# Patient Record
Sex: FEMALE | Race: BLACK OR AFRICAN AMERICAN
[De-identification: names, ages, dates, MRNs, and addresses within clinical notes are randomized per-mention and may not be internally consistent; named-entity substitution may affect disease eponyms.]

---

## 2017-03-13 PROBLEM — Z00.00 ENCOUNTER FOR PREVENTIVE HEALTH EXAMINATION: Status: ACTIVE | Noted: 2017-03-13

## 2017-04-11 ENCOUNTER — NON-APPOINTMENT (OUTPATIENT)
Age: 82
End: 2017-04-11

## 2017-04-11 ENCOUNTER — APPOINTMENT (OUTPATIENT)
Dept: GERIATRICS | Facility: CLINIC | Age: 82
End: 2017-04-11

## 2017-04-11 VITALS
HEIGHT: 67 IN | SYSTOLIC BLOOD PRESSURE: 130 MMHG | DIASTOLIC BLOOD PRESSURE: 72 MMHG | HEART RATE: 95 BPM | TEMPERATURE: 95.9 F | BODY MASS INDEX: 16.79 KG/M2 | WEIGHT: 107 LBS | OXYGEN SATURATION: 95 %

## 2017-04-11 DIAGNOSIS — Z87.891 PERSONAL HISTORY OF NICOTINE DEPENDENCE: ICD-10-CM
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
## 2017-04-11 DIAGNOSIS — J32.0 CHRONIC MAXILLARY SINUSITIS: ICD-10-CM

## 2017-04-11 DIAGNOSIS — R68.89 OTHER GENERAL SYMPTOMS AND SIGNS: ICD-10-CM

## 2017-04-11 DIAGNOSIS — Z71.89 OTHER SPECIFIED COUNSELING: ICD-10-CM

## 2017-04-11 DIAGNOSIS — C50.911 MALIGNANT NEOPLASM OF UNSPECIFIED SITE OF RIGHT FEMALE BREAST: ICD-10-CM

## 2017-04-11 DIAGNOSIS — M81.0 AGE-RELATED OSTEOPOROSIS W/OUT CURRENT PATHOLOGICAL FRACTURE: ICD-10-CM
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
## 2017-04-11 DIAGNOSIS — E46 UNSPECIFIED PROTEIN-CALORIE MALNUTRITION: ICD-10-CM

## 2017-04-11 DIAGNOSIS — Z29.9 ENCOUNTER FOR PROPHYLACTIC MEASURES, UNSPECIFIED: ICD-10-CM

## 2017-04-11 DIAGNOSIS — R63.4 ABNORMAL WEIGHT LOSS: ICD-10-CM

## 2017-04-11 RX ORDER — LETROZOLE 2.5 MG/1
2.5 TABLET, FILM COATED ORAL DAILY
Refills: 0 | Status: ACTIVE | COMMUNITY
Start: 2017-04-11

## 2017-04-11 RX ORDER — CHROMIUM 200 MCG
1000 TABLET ORAL DAILY
Qty: 30 | Refills: 3 | Status: ACTIVE | COMMUNITY
Start: 2017-04-11 | End: 1900-01-01

## 2017-04-11 RX ORDER — DOCUSATE SODIUM 100 MG/1
100 CAPSULE ORAL
Refills: 0 | Status: ACTIVE | COMMUNITY
Start: 2017-04-11

## 2017-04-14 ENCOUNTER — MESSAGE (OUTPATIENT)
Age: 82
End: 2017-04-14

## 2017-05-30 ENCOUNTER — APPOINTMENT (OUTPATIENT)
Dept: GERIATRICS | Facility: CLINIC | Age: 82
End: 2017-05-30

## 2017-07-18 ENCOUNTER — APPOINTMENT (OUTPATIENT)
Dept: GERIATRICS | Facility: CLINIC | Age: 82
End: 2017-07-18

## 2021-12-12 ENCOUNTER — EMERGENCY (EMERGENCY)
Facility: HOSPITAL | Age: 86
LOS: 1 days | Discharge: HOME | End: 2021-12-12
Attending: EMERGENCY MEDICINE
Payer: MEDICARE

## 2021-12-12 VITALS
DIASTOLIC BLOOD PRESSURE: 65 MMHG | OXYGEN SATURATION: 96 % | SYSTOLIC BLOOD PRESSURE: 145 MMHG | RESPIRATION RATE: 18 BRPM | HEART RATE: 85 BPM | TEMPERATURE: 98 F

## 2021-12-12 VITALS — HEIGHT: 66 IN | WEIGHT: 110.01 LBS

## 2021-12-12 LAB
ALBUMIN SERPL ELPH-MCNC: 3.6 G/DL — SIGNIFICANT CHANGE UP (ref 3.3–5)
ALP SERPL-CCNC: 123 U/L — HIGH (ref 40–120)
ALT FLD-CCNC: 20 U/L — SIGNIFICANT CHANGE UP (ref 10–45)
ANION GAP SERPL CALC-SCNC: 14 MMOL/L — SIGNIFICANT CHANGE UP (ref 5–17)
APPEARANCE UR: CLEAR — SIGNIFICANT CHANGE UP
APTT BLD: 26.8 SEC — LOW (ref 27.5–35.5)
AST SERPL-CCNC: 26 U/L — SIGNIFICANT CHANGE UP (ref 10–40)
BACTERIA # UR AUTO: NEGATIVE — SIGNIFICANT CHANGE UP
BASOPHILS # BLD AUTO: 0.04 K/UL — SIGNIFICANT CHANGE UP (ref 0–0.2)
BASOPHILS NFR BLD AUTO: 0.3 % — SIGNIFICANT CHANGE UP (ref 0–2)
BILIRUB SERPL-MCNC: 0.5 MG/DL — SIGNIFICANT CHANGE UP (ref 0.2–1.2)
BILIRUB UR-MCNC: NEGATIVE — SIGNIFICANT CHANGE UP
BUN SERPL-MCNC: 16 MG/DL — SIGNIFICANT CHANGE UP (ref 7–23)
CALCIUM SERPL-MCNC: 9.6 MG/DL — SIGNIFICANT CHANGE UP (ref 8.4–10.5)
CHLORIDE SERPL-SCNC: 97 MMOL/L — SIGNIFICANT CHANGE UP (ref 96–108)
CO2 SERPL-SCNC: 22 MMOL/L — SIGNIFICANT CHANGE UP (ref 22–31)
COLOR SPEC: COLORLESS — SIGNIFICANT CHANGE UP
CREAT SERPL-MCNC: 0.77 MG/DL — SIGNIFICANT CHANGE UP (ref 0.5–1.3)
DIFF PNL FLD: NEGATIVE — SIGNIFICANT CHANGE UP
EOSINOPHIL # BLD AUTO: 0.01 K/UL — SIGNIFICANT CHANGE UP (ref 0–0.5)
EOSINOPHIL NFR BLD AUTO: 0.1 % — SIGNIFICANT CHANGE UP (ref 0–6)
EPI CELLS # UR: 2 /HPF — SIGNIFICANT CHANGE UP
GLUCOSE SERPL-MCNC: 116 MG/DL — HIGH (ref 70–99)
GLUCOSE UR QL: NEGATIVE — SIGNIFICANT CHANGE UP
HCT VFR BLD CALC: 44.4 % — SIGNIFICANT CHANGE UP (ref 34.5–45)
HGB BLD-MCNC: 14.7 G/DL — SIGNIFICANT CHANGE UP (ref 11.5–15.5)
HYALINE CASTS # UR AUTO: 1 /LPF — SIGNIFICANT CHANGE UP (ref 0–2)
IMM GRANULOCYTES NFR BLD AUTO: 0.5 % — SIGNIFICANT CHANGE UP (ref 0–1.5)
INR BLD: 1.03 RATIO — SIGNIFICANT CHANGE UP (ref 0.88–1.16)
KETONES UR-MCNC: NEGATIVE — SIGNIFICANT CHANGE UP
LACTATE SERPL-SCNC: 2.3 MMOL/L — HIGH (ref 0.7–2)
LEUKOCYTE ESTERASE UR-ACNC: ABNORMAL
LYMPHOCYTES # BLD AUTO: 0.92 K/UL — LOW (ref 1–3.3)
LYMPHOCYTES # BLD AUTO: 7.8 % — LOW (ref 13–44)
MCHC RBC-ENTMCNC: 28.3 PG — SIGNIFICANT CHANGE UP (ref 27–34)
MCHC RBC-ENTMCNC: 33.1 GM/DL — SIGNIFICANT CHANGE UP (ref 32–36)
MCV RBC AUTO: 85.5 FL — SIGNIFICANT CHANGE UP (ref 80–100)
MONOCYTES # BLD AUTO: 0.41 K/UL — SIGNIFICANT CHANGE UP (ref 0–0.9)
MONOCYTES NFR BLD AUTO: 3.5 % — SIGNIFICANT CHANGE UP (ref 2–14)
NEUTROPHILS # BLD AUTO: 10.43 K/UL — HIGH (ref 1.8–7.4)
NEUTROPHILS NFR BLD AUTO: 87.8 % — HIGH (ref 43–77)
NITRITE UR-MCNC: NEGATIVE — SIGNIFICANT CHANGE UP
NRBC # BLD: 0 /100 WBCS — SIGNIFICANT CHANGE UP (ref 0–0)
PH UR: 7 — SIGNIFICANT CHANGE UP (ref 5–8)
PLATELET # BLD AUTO: 126 K/UL — LOW (ref 150–400)
POTASSIUM SERPL-MCNC: 4.4 MMOL/L — SIGNIFICANT CHANGE UP (ref 3.5–5.3)
POTASSIUM SERPL-SCNC: 4.4 MMOL/L — SIGNIFICANT CHANGE UP (ref 3.5–5.3)
PROT SERPL-MCNC: 7.7 G/DL — SIGNIFICANT CHANGE UP (ref 6–8.3)
PROT UR-MCNC: ABNORMAL
PROTHROM AB SERPL-ACNC: 12.3 SEC — SIGNIFICANT CHANGE UP (ref 10.6–13.6)
RBC # BLD: 5.19 M/UL — SIGNIFICANT CHANGE UP (ref 3.8–5.2)
RBC # FLD: 14.4 % — SIGNIFICANT CHANGE UP (ref 10.3–14.5)
RBC CASTS # UR COMP ASSIST: 2 /HPF — SIGNIFICANT CHANGE UP (ref 0–4)
SARS-COV-2 RNA SPEC QL NAA+PROBE: SIGNIFICANT CHANGE UP
SODIUM SERPL-SCNC: 133 MMOL/L — LOW (ref 135–145)
SP GR SPEC: 1.03 — HIGH (ref 1.01–1.02)
UROBILINOGEN FLD QL: NEGATIVE — SIGNIFICANT CHANGE UP
WBC # BLD: 11.87 K/UL — HIGH (ref 3.8–10.5)
WBC # FLD AUTO: 11.87 K/UL — HIGH (ref 3.8–10.5)
WBC UR QL: 18 /HPF — HIGH (ref 0–5)

## 2021-12-12 PROCEDURE — 93010 ELECTROCARDIOGRAM REPORT: CPT

## 2021-12-12 PROCEDURE — 93005 ELECTROCARDIOGRAM TRACING: CPT

## 2021-12-12 PROCEDURE — 76377 3D RENDER W/INTRP POSTPROCES: CPT | Mod: 26

## 2021-12-12 PROCEDURE — 99284 EMERGENCY DEPT VISIT MOD MDM: CPT

## 2021-12-12 PROCEDURE — 72125 CT NECK SPINE W/O DYE: CPT | Mod: 26,MA

## 2021-12-12 PROCEDURE — 81001 URINALYSIS AUTO W/SCOPE: CPT

## 2021-12-12 PROCEDURE — 72192 CT PELVIS W/O DYE: CPT | Mod: MA

## 2021-12-12 PROCEDURE — 99284 EMERGENCY DEPT VISIT MOD MDM: CPT | Mod: 25

## 2021-12-12 PROCEDURE — 80053 COMPREHEN METABOLIC PANEL: CPT

## 2021-12-12 PROCEDURE — 71045 X-RAY EXAM CHEST 1 VIEW: CPT | Mod: 26

## 2021-12-12 PROCEDURE — 70450 CT HEAD/BRAIN W/O DYE: CPT | Mod: MA

## 2021-12-12 PROCEDURE — 85730 THROMBOPLASTIN TIME PARTIAL: CPT

## 2021-12-12 PROCEDURE — 73501 X-RAY EXAM HIP UNI 1 VIEW: CPT

## 2021-12-12 PROCEDURE — 85025 COMPLETE CBC W/AUTO DIFF WBC: CPT

## 2021-12-12 PROCEDURE — U0005: CPT

## 2021-12-12 PROCEDURE — 73552 X-RAY EXAM OF FEMUR 2/>: CPT | Mod: 26,50

## 2021-12-12 PROCEDURE — 72190 X-RAY EXAM OF PELVIS: CPT

## 2021-12-12 PROCEDURE — 72192 CT PELVIS W/O DYE: CPT | Mod: 26,MA

## 2021-12-12 PROCEDURE — 72125 CT NECK SPINE W/O DYE: CPT | Mod: MA

## 2021-12-12 PROCEDURE — 83605 ASSAY OF LACTIC ACID: CPT

## 2021-12-12 PROCEDURE — 70450 CT HEAD/BRAIN W/O DYE: CPT | Mod: 26,MA

## 2021-12-12 PROCEDURE — 70487 CT MAXILLOFACIAL W/DYE: CPT | Mod: 26,MA

## 2021-12-12 PROCEDURE — 73501 X-RAY EXAM HIP UNI 1 VIEW: CPT | Mod: 26,RT

## 2021-12-12 PROCEDURE — U0003: CPT

## 2021-12-12 PROCEDURE — 76377 3D RENDER W/INTRP POSTPROCES: CPT

## 2021-12-12 PROCEDURE — 76377 3D RENDER W/INTRP POSTPROCES: CPT | Mod: 26,77

## 2021-12-12 PROCEDURE — 70487 CT MAXILLOFACIAL W/DYE: CPT | Mod: MA

## 2021-12-12 PROCEDURE — 71045 X-RAY EXAM CHEST 1 VIEW: CPT

## 2021-12-12 PROCEDURE — 72170 X-RAY EXAM OF PELVIS: CPT | Mod: 26,59

## 2021-12-12 PROCEDURE — 73552 X-RAY EXAM OF FEMUR 2/>: CPT

## 2021-12-12 PROCEDURE — 85610 PROTHROMBIN TIME: CPT

## 2021-12-12 RX ORDER — SODIUM CHLORIDE 9 MG/ML
500 INJECTION INTRAMUSCULAR; INTRAVENOUS; SUBCUTANEOUS ONCE
Refills: 0 | Status: COMPLETED | OUTPATIENT
Start: 2021-12-12 | End: 2021-12-12

## 2021-12-12 RX ORDER — ACETAMINOPHEN 500 MG
650 TABLET ORAL ONCE
Refills: 0 | Status: COMPLETED | OUTPATIENT
Start: 2021-12-12 | End: 2021-12-12

## 2021-12-12 RX ADMIN — SODIUM CHLORIDE 1000 MILLILITER(S): 9 INJECTION INTRAMUSCULAR; INTRAVENOUS; SUBCUTANEOUS at 08:48

## 2021-12-12 RX ADMIN — Medication 150 MILLIGRAM(S): at 13:23

## 2021-12-12 NOTE — ED PROVIDER NOTE - OBJECTIVE STATEMENT
89yo F h.o breast cancer s/p mastectomy (recent PET scan with no residual disease) presenting to ED after fall at home with R hip pain and limited ambulation. Pt reports was getting into bed when she misjudged where the edge of the bed was, missed and fell onto the carpeted ground (no LOC, unk if head trauma, no blood thinners). On the ground for only 20 seconds, was immediately brought up to bed by daughter. Complained of L hip pain without radiation, mild, sore in quality. No abdominal pain, headache, visual changes, sob, cough, fever/chills. Recent 40 lb weight loss, malignancy work up as outpatient. On tramadol for pain, recent dental procedure yesterday for poor dentition. ROS otherwise negative.

## 2021-12-12 NOTE — ED PROVIDER NOTE - PROGRESS NOTE DETAILS
Semaj Reynolds, PGY-2: Pt reexamined at bedside, resting comfortably, vitals stable. CXR w minimal atelectasis, Xray hip/pelvis/femurs negative for fracture. No metabolic derrangement. Leukocytosis w L shift, urine pending. Semaj Reynolds, PGY-2: CT demonstrates acute inferior and superior pubic rami fracture. Orthopedics paged. Dental paged given poor dentition and recent abscess, will see patient. Semaj Reynolds, PGY-2: Pt reexamined at bedside, resting comfortably, vitals stable. Seen by orthopedics who recommend weight bearing as tolerated, dental recommends outpatient dental surgeon follow up. Pt agreeable to discharge at assisted living.

## 2021-12-12 NOTE — ED ADULT NURSE REASSESSMENT NOTE - NS ED NURSE REASSESS COMMENT FT1
Pt placed on primafit. Repositioned for comfort. Denies tylenol. Daughter at bedside
Patient resting comfortably, states she has no pain until she moves, breathing unlabored, placed on Primafit, family at bedside, denies chest pain, SOB, n/v, VS stable, will continue to monitor VS and reassess pain.

## 2021-12-12 NOTE — ED PROVIDER NOTE - ATTENDING CONTRIBUTION TO CARE
Patient is a 89 yo F here for evaluation after fall at home. Patient is able to provide history, states that she fell from bed, landed on her bottom. She is unsure if she hit her head or passed out. Daughter is at bedside, states that patient is now requiring a lot of assistance with walking and usually does not require a cane or walker. Of note, patient has had significant weight loss, is now being evaluated by a private dentist for dental caries and concern for malignancy in her mouth. She had a tooth pulled yesterday, abscess drained was started on clindamycin and tramadol. Daughter gave patient tramadol after the fall. Patient denies any headache, back pain, chest pain, abdominal pain, but does complain of pain to right leg and right hip. No recent illness. No fevers, chills, nausea, vomiting, diarrhea. Patient has had recent urinary frequency per daughter.     VS noted  Gen. no acute distress, Non toxic   HEENT: EOMI, mmm, poor dentition, upper right molar appears to be infected  Spine: No C-T-L spine tenderness  Lungs: CTAB/L no C/ W /R   CVS: RRR   Abd; Soft non tender, non distended   Pelvis: stable, full ROM of bilateral hips, no obvious deformity. + 2 DP pulses bilaterally  Ext: no edema  Skin: no rash  Neuro AAOx3 non focal clear speech  a/p: s/p fall - unclear loc, head is atraumatic. Will get CT Head, CT C-spine, CXR, pelvis XR, labs, CT Max/Face with IV contrast to evaluate for dental disease, u/a. Will give tylenol and reassess. If patient cannot ambulate, may need CT Pelvis and admission.   - Ruby VALDES

## 2021-12-12 NOTE — ED PROVIDER NOTE - PHYSICAL EXAMINATION
GENERAL: non-toxic appearing, cachectic, alert, in NAD  HEENT: Poor dentition, atraumatic, normocephalic, Vision grossly intact, no conjunctivitis or discharge, hearing grossly intact,  no nasal discharge, epistaxis   CARDIAC: RRR, normal S1S2,  no appreciable murmurs, no cyanosis, cap refill < 2 seconds  PULM: normal work of breathing, oxygen saturation on RA wnl, CTAB, no crackles, rales, rhonchi, or wheezing  GI: abdomen nondistended, soft, nontender, no guarding or rebound tenderness, no palpable masses  NEURO: awake and alert, follows commands, normal speech, PERRLA, EOMI, no focal motor or sensory deficits  MSK: mild tenderness of b/l hips with no gross deformity with full ROM at b/l hips and knees, no c-spine tenderness, no joint swelling or erythema, ranging all extremities with no appreciable loss of ROM  EXT: no peripheral edema, calf tenderness, redness or swelling  SKIN: warm, dry, and intact, no rashes  PSYCH: appropriate mood and affect

## 2021-12-12 NOTE — ED PROVIDER NOTE - NSFOLLOWUPCLINICS_GEN_ALL_ED_FT
Oral & Maxillofacial Surgery  Department of Dental Medicine  [phone] [place] [address]  Phone: [phone]  Fax: [phone]

## 2021-12-12 NOTE — ED PROVIDER NOTE - CARE PROVIDER_API CALL
Caleb Jones)  Orthopedics  [address] [place] [address]  Phone: [phone]  Fax: [phone]  Follow Up Time:

## 2021-12-12 NOTE — CONSULT NOTE ADULT - SUBJECTIVE AND OBJECTIVE BOX
Orthopedic Surgery Consult Note    89y Female presents c/o R groin pain and difficulty with ambulation s/p mechanical fall. Able to ambulate after the fall. Denies HS/LOC. Denies numbness/tingling. Denies fever/chills. Denies pain/injury elsewhere. Patient is an independent community ambulator at baseline.     PAST MEDICAL & SURGICAL HISTORY:  Breast cancer      MEDICATIONS  (STANDING):    Allergies    penicillin (Hives)    Intolerances                              14.7   11.87 )-----------( 126      ( 12 Dec 2021 08:53 )             44.4     12 Dec 2021 08:53    133    |  97     |  16     ----------------------------<  116    4.4     |  22     |  0.77     Ca    9.6        12 Dec 2021 08:53    TPro  7.7    /  Alb  3.6    /  TBili  0.5    /  DBili  x      /  AST  26     /  ALT  20     /  AlkPhos  123    12 Dec 2021 08:53    PT/INR - ( 12 Dec 2021 08:53 )   PT: 12.3 sec;   INR: 1.03 ratio         PTT - ( 12 Dec 2021 08:53 )  PTT:26.8 sec  Vital Signs Last 24 Hrs  T(C): 36.8 (12-12-21 @ 13:34), Max: 36.8 (12-12-21 @ 08:30)  T(F): 98.2 (12-12-21 @ 13:34), Max: 98.3 (12-12-21 @ 08:30)  HR: 85 (12-12-21 @ 13:34) (70 - 85)  BP: 145/65 (12-12-21 @ 13:34) (128/95 - 145/65)  BP(mean): --  RR: 18 (12-12-21 @ 13:34) (16 - 18)  SpO2: 96% (12-12-21 @ 13:34) (96% - 96%)    Physical Exam  Gen: NAD  BLLE:   skin intact, difficulty with SLR, neg log roll  +ttp over R pubic rami/symphysis  compartments soft  motor intact GS/TA/EHL/FHL  SILT S/S/SP/DP/T, 2+ DP    Secondary: No TTP over bony prominences. SILT b/l, ROM intact b/l. Distal pulses palpable.     Imaging:   CT Pelvis demonstrates R superior and inferior pubic rami fractures. No e/o hip fx/dislocation    AP: 89yFemale with R superior/inferior pubic rami fractures  -Pain control  -inlet/outlet XR  -WBAT with assistive devices as needed  -Repeat Pelvic XR's in 1 week  -Ca/Vit D  -Outpt osteoporosis workup  -No acute orthopedic surgical intervention at this time  -FU with Dr. Jones as outpatient. Call [phone] for appointment    Discussed with attending orthopaedic trauma surgeon on call, Dr. Karen Jean Baptiste PGY-2  Orthopaedic Surgery  [facility] [phone]  [facility] [phone]  [facility] [phone] 
Patient is a 89y old  Female who presents with a chief complaint of mechanical fall    HPI: Patient was evaluated by private dentist for dental caries, oral malignancy,  had #19 extracted, and had an abscess on the lower right drained by private dentist. Patient was prescribed clindamycin.      PAST MEDICAL & SURGICAL HISTORY:  Breast cancer    Allergies    penicillin (Hives)    Intolerances      *Last Dental Visit: yesterday    Vital Signs Last 24 Hrs  T(C): 36.8 (12 Dec 2021 13:34), Max: 36.8 (12 Dec 2021 08:30)  T(F): 98.2 (12 Dec 2021 13:34), Max: 98.3 (12 Dec 2021 08:30)  HR: 85 (12 Dec 2021 13:34) (70 - 85)  BP: 145/65 (12 Dec 2021 13:34) (128/95 - 145/65)  BP(mean): --  RR: 18 (12 Dec 2021 13:34) (16 - 18)  SpO2: 96% (12 Dec 2021 13:34) (96% - 96%)    EOE:  TMJ ( -  ) clicks                    ( -   ) pops                    (  -  ) crepitus             Mandible FROM             Facial bones and MOM grossly intact             ( -  ) trismus             ( -  ) LAD             (-   ) swelling             (-   ) asymmetry             ( -  ) palpation             ( -  ) SOB             (  - ) dysphagia             (  ) LOC    IOE:  permanent dentition: multiple carious teeth, multiple missing teeth, extraction socket #19 sutured, root tips, marginal inflammation on maxillary anterior region           hard/soft palate:  (  + ) palatal torus, No pathology noted           tongue/FOM No pathology noted           labial/buccal mucosa No pathology noted           (  - ) swelling   Mobility: none    Radiographs: none taken. Patient showed me panoramic radiograph taken by outpatient dentist.    LABS:                        14.7   11.87 )-----------( 126      ( 12 Dec 2021 08:53 )             44.4     12-12    133<L>  |  97  |  16  -----------------------------<  116<H>  4.4   |  22  |  0.77    Ca    9.6      12 Dec 2021 08:53    TPro  7.7  /  Alb  3.6  /  TBili  0.5  /  DBili  x   /  AST  26  /  ALT  20  /  AlkPhos  123<H>  -    WBC Count: 11.87 K/uL *H* [3.80 - 10.50] ( @ 08:53)    Platelet Count - Automated: 126 K/uL *L* [150 - 400] ( @ 08:53)    INR: 1.03 ratio [0.88 - 1.16] ( @ 08:53)      Urinalysis Basic - ( 12 Dec 2021 10:56 )    Color: Colorless / Appearance: Clear / S.031 / pH: x  Gluc: x / Ketone: Negative  / Bili: Negative / Urobili: Negative   Blood: x / Protein: 30 mg/dL / Nitrite: Negative   Leuk Esterase: Moderate / RBC: 2 /hpf / WBC 18 /HPF   Sq Epi: x / Non Sq Epi: 2 /hpf / Bacteria: Negative      ASSESSMENT: Bedside exam performed. No signs of acute infection at this time. However upon looking at panoramic radiograph provided by patient, large radiolucency around roots of #19 extending to angle and ramus of mandibular visualized. Patient previously advised to consult an oral surgeon regarding possible malignancy. Contacted OS resident Dr. Sharma who recommended F/u with oral surgeon on outpatient basis regarding large radiolucency. Told patient to contact [facility] for further evaluation or outpatient oral surgeon of their choice. CBCT also read by Dr. Sharma. No signs of acute infection/ dental abscesses present.     PROCEDURE:  Verbal and written consent given.     RECOMMENDATIONS:   1) oral surgery consult regarding large radiolucency on left posterior mandibular region  2) Dental F/U with outpatient dentist for comprehensive dental care.   3) If any difficulty swallowing/breathing, fever occur, page dental.     Dr. Stacia Montano  Oral surgeon consulted:  Dr. sharma

## 2021-12-12 NOTE — ED ADULT TRIAGE NOTE - ARRIVAL INFO ADDITIONAL COMMENTS
Patient was inadvertently brought back to main ED from triage without VS being taken. Patient A&O x4 and well appearing. Vitals taken in the main ED.

## 2021-12-12 NOTE — ED ADULT NURSE NOTE - OBJECTIVE STATEMENT
89y female coming in from home s/p fall. A&Ox3 and VSS. Denies medical hx. Pt reports mechanical fall out of bed this morning. Denies hitting head, LOC or blood thinner use. Pt c/o 5/10 right groin pain. Reports ambulatory post-fall with assistance. Denies chest pain, dizziness, sob, fever/chills, cough, n/v/d and sick contacts. Upon exam, neuro intact. Respirations even and unlabored. Abdomen soft, nontender. Full ROM of all extremities. 89y female coming in from home s/p fall. A&Ox3 and VSS. Hx of breast cancer and right mastectomy. Pt reports mechanical fall out of bed this morning' reports misjudging distance from the bed. Denies hitting head, LOC or blood thinner use. Pt c/o 5/10 right groin pain. Reports ambulatory post-fall with assistance. Pt also reports 40lb weight loss and dental procedure yesterday. Denies chest pain, dizziness, sob, fever/chills, cough, n/v/d and sick contacts. Upon exam, neuro intact. Respirations even and unlabored. Abdomen soft, nontender. Full ROM of all extremities. Poor dentition noted.

## 2021-12-12 NOTE — ED PROVIDER NOTE - WET READ LAUNCH FT
There are 3 Wet Read(s) to document. There are no Wet Read(s) to document. There is 1 Wet Read(s) to document.

## 2021-12-12 NOTE — ED PROVIDER NOTE - PATIENT PORTAL LINK FT
You can access the FollowMyHealth Patient Portal offered by [facility] by registering at the following website: [email or web address]. By joining [facility]’s FollowMyHealth portal, you will also be able to view your health information using other applications (apps) compatible with our system.

## 2021-12-12 NOTE — ED PROVIDER NOTE - NSFOLLOWUPINSTRUCTIONS_ED_ALL_ED_FT
You were seen and evaluated today for hip pain likely caused by caused by a pelvis fracture fracture. Our work-up today included blood work, imaging, urine studies, EKG, the results of which have been explained to you and provided to you separately. Please keep a copy of these results to present to doctor in future visits.     - Follow up with your primary care physician within 7-10 days  - Take 500-1000mg acetaminophen (tylenol) every 6-8 hours as needed  - Take 400-600mg ibuprofen (advil or motrin) every 6-8 hours as needed, take with food  - Please refrain from heavy lifting or strenuous activity for the next 1-2 weeks.   - Expect a call within the next 2-3 business days to schedule an appointment with orthopedics  - If you do not recieve a call, please call the number provided to you in this packet to schedule an appointment within one week from today    Please return to the Emergency Department should you experience any of the following:  - New or worsening pain  - Numbness or weakness of your legs or feet  - Fever, unexplained weight loss, night sweats  - Blood in stool or in urine  - New weakness, fatigue, or fainting  - Any new concerning symptoms You were seen and evaluated today for hip pain likely caused by caused by a pelvis fracture fracture. Our work-up today included blood work, imaging, urine studies, EKG, the results of which have been explained to you and provided to you separately. Please keep a copy of these results to present to doctor in future visits.     - Follow up with your primary care physician within 7-10 days  - You are weight bearing as tolerated meaning you may walk with assistance if you are not in significant pain  - Take 500-1000mg acetaminophen (tylenol) every 6-8 hours as needed  - Take 400-600mg ibuprofen (advil or motrin) every 6-8 hours as needed, take with food  - Please refrain from heavy lifting or strenuous activity for the next 1-2 weeks.   - Expect a call within the next 2-3 business days to schedule an appointment with orthopedics  - If you do not receive a call, please call the number provided to you in this packet to schedule an appointment within one week from today    Please return to the Emergency Department should you experience any of the following:  - New or worsening pain  - Numbness or weakness of your legs or feet  - Fever, unexplained weight loss, night sweats  - Blood in stool or in urine  - New weakness, fatigue, or fainting  - Any new concerning symptoms

## 2021-12-12 NOTE — ED PROVIDER NOTE - CLINICAL SUMMARY MEDICAL DECISION MAKING FREE TEXT BOX
89yo w urinary frequency and fall with hip pain. Consider UTI. SENIA yun with pelvic fracture given groin pain, no gross hip deformity will assess for hip fracture. Poor dentition with multiple abcesses will assess with CT and possible CT